# Patient Record
Sex: MALE | Race: WHITE | NOT HISPANIC OR LATINO | URBAN - METROPOLITAN AREA
[De-identification: names, ages, dates, MRNs, and addresses within clinical notes are randomized per-mention and may not be internally consistent; named-entity substitution may affect disease eponyms.]

---

## 2021-03-12 ENCOUNTER — HOSPITAL ENCOUNTER (EMERGENCY)
Facility: HOSPITAL | Age: 36
Discharge: HOME | End: 2021-03-12
Attending: EMERGENCY MEDICINE
Payer: COMMERCIAL

## 2021-03-12 VITALS
OXYGEN SATURATION: 99 % | HEART RATE: 76 BPM | TEMPERATURE: 98.4 F | SYSTOLIC BLOOD PRESSURE: 154 MMHG | DIASTOLIC BLOOD PRESSURE: 92 MMHG | RESPIRATION RATE: 16 BRPM

## 2021-03-12 DIAGNOSIS — G51.0 BELL PALSY: Primary | ICD-10-CM

## 2021-03-12 PROCEDURE — 86618 LYME DISEASE ANTIBODY: CPT | Performed by: PHYSICIAN ASSISTANT

## 2021-03-12 PROCEDURE — 99283 EMERGENCY DEPT VISIT LOW MDM: CPT

## 2021-03-12 PROCEDURE — 63700000 HC SELF-ADMINISTRABLE DRUG: Performed by: PHYSICIAN ASSISTANT

## 2021-03-12 PROCEDURE — 36415 COLL VENOUS BLD VENIPUNCTURE: CPT | Performed by: PHYSICIAN ASSISTANT

## 2021-03-12 RX ORDER — PREDNISONE 20 MG/1
60 TABLET ORAL ONCE
Status: COMPLETED | OUTPATIENT
Start: 2021-03-12 | End: 2021-03-12

## 2021-03-12 RX ORDER — ALBUTEROL SULFATE 90 UG/1
2 INHALANT RESPIRATORY (INHALATION) EVERY 6 HOURS PRN
COMMUNITY

## 2021-03-12 RX ORDER — VALACYCLOVIR HYDROCHLORIDE 1 G/1
1000 TABLET, FILM COATED ORAL ONCE
Status: COMPLETED | OUTPATIENT
Start: 2021-03-12 | End: 2021-03-12

## 2021-03-12 RX ORDER — VALACYCLOVIR HYDROCHLORIDE 1 G/1
1000 TABLET, FILM COATED ORAL 2 TIMES DAILY
Qty: 14 TABLET | Refills: 0 | Status: SHIPPED | OUTPATIENT
Start: 2021-03-12 | End: 2021-03-19

## 2021-03-12 RX ORDER — PREDNISONE 10 MG/1
10 TABLET ORAL SEE ADMIN INSTRUCTIONS
Qty: 15 TABLET | Refills: 0 | Status: SHIPPED | OUTPATIENT
Start: 2021-03-12 | End: 2021-03-17

## 2021-03-12 RX ADMIN — PREDNISONE 60 MG: 20 TABLET ORAL at 12:56

## 2021-03-12 RX ADMIN — VALACYCLOVIR HYDROCHLORIDE 1000 MG: 1 TABLET, FILM COATED ORAL at 12:56

## 2021-03-12 ASSESSMENT — ENCOUNTER SYMPTOMS
SPEECH DIFFICULTY: 0
VOICE CHANGE: 0
FACIAL SWELLING: 0
CHILLS: 0
LIGHT-HEADEDNESS: 0
ARTHRALGIAS: 0
TROUBLE SWALLOWING: 0
NUMBNESS: 0
COUGH: 0
NECK STIFFNESS: 0
SINUS PRESSURE: 0
HEADACHES: 0
DIZZINESS: 0
FEVER: 0
RHINORRHEA: 0
EYE ITCHING: 0
NAUSEA: 0
VOMITING: 0
EYE PAIN: 0
NECK PAIN: 0
MYALGIAS: 0
WEAKNESS: 0

## 2021-03-12 NOTE — ED PROVIDER NOTES
Emergency Medicine Note  HPI   HISTORY OF PRESENT ILLNESS     5-year-old male with a past medical history of asthma and seasonal allergies presents the emergency department stating that this morning after waking up he noticed he could utilize the facial muscles he needed to rinse and spit toothpaste.  He has since noted difficulty in swallowing, cheeks on right side. States right cheek feels mildly heavy but not swollen.  He denies any ear pain, hearing loss, dizziness, rash, headache, vision change, speech change, nausea, vomiting, dental pain, trouble swallowing, drooling.  He denies any recent illness.  He does work outside on a daily basis for the [employer].  He denies known tick bite.            Patient History   PAST HISTORY     Reviewed from Nursing Triage:      Past Medical History:   Diagnosis Date   • Asthma        History reviewed. No pertinent surgical history.    History reviewed. No pertinent family history.    Social History     Tobacco Use   • Smoking status: Never Smoker   • Smokeless tobacco: Never Used   Substance Use Topics   • Alcohol use: Yes   • Drug use: Never         Review of Systems   REVIEW OF SYSTEMS     Review of Systems   Constitutional: Negative for chills and fever.   HENT: Negative for congestion, ear pain, facial swelling, rhinorrhea, sinus pressure, tinnitus, trouble swallowing and voice change.    Eyes: Negative for pain, itching and visual disturbance.   Respiratory: Negative for cough.    Gastrointestinal: Negative for nausea and vomiting.   Musculoskeletal: Negative for arthralgias, myalgias, neck pain and neck stiffness.   Skin: Negative for rash.   Neurological: Negative for dizziness, speech difficulty, weakness, light-headedness, numbness and headaches.         VITALS     ED Vitals    Date/Time Temp Pulse Resp BP SpO2 [facility]   03/12/21 1238 36.9 °C (98.4 °F) 76 16 154/92 99 % MF        Pulse Ox %: 99 % (03/12/21 1311)  Pulse Ox Interpretation: Normal (03/12/21 1311)            Physical Exam   PHYSICAL EXAM     Physical Exam  Vitals signs and nursing note reviewed.   Constitutional:       General: He is not in acute distress.     Appearance: He is not toxic-appearing.   HENT:      Head: Normocephalic.      Right Ear: Tympanic membrane, ear canal and external ear normal.      Left Ear: Tympanic membrane, ear canal and external ear normal.      Nose: Nose normal.      Mouth/Throat:      Mouth: Mucous membranes are moist.      Pharynx: Oropharynx is clear.      Comments: Uvula raise and symmetric   Shoulder shrug intact bilaterally   Eyes:      Extraocular Movements: Extraocular movements intact.      Conjunctiva/sclera: Conjunctivae normal.      Pupils: Pupils are equal, round, and reactive to light.   Neck:      Musculoskeletal: Normal range of motion and neck supple.   Cardiovascular:      Rate and Rhythm: Normal rate and regular rhythm.   Pulmonary:      Effort: Pulmonary effort is normal.      Breath sounds: Normal breath sounds.   Skin:     General: Skin is warm.      Capillary Refill: Capillary refill takes less than 2 seconds.   Neurological:      Mental Status: He is alert and oriented to person, place, and time.      Sensory: Sensation is intact.      Coordination: Coordination is intact.      Comments: Right side facial motor deficits: unable to raise right eyebrow, puff out right cheek, make smile or frown on right side; can close right eye but not tightly    Extremity strength 5/5 proximally and distally   Psychiatric:         Mood and Affect: Mood normal.           PROCEDURES     Procedures     DATA     Results     Procedure Component Value Units Date/Time    Lyme EIA reflex WB [[id number]] Collected: 03/12/21 1255    Specimen: Blood, Venous Updated: 03/12/21 1304          Imaging Results    None         No orders to display       Scoring tools                               ED Course & MDM   MDM / ED COURSE and CLINICAL IMPRESSIONS     MDM    Clinical Impressions as of Mar 12  1329   Lay palsy            Ysabel Mcclain PA C  03/12/21 3375

## 2021-03-14 NOTE — ED ATTESTATION NOTE
The patient was evaluated and managed by the physician assistant / nurse practitioner.       Crissy Nolasco,   03/13/21 1938

## 2021-03-16 LAB — B BURGDOR AB SER IA-ACNC: 0.12 RATIO

## 2021-04-20 ENCOUNTER — IMPORTED ENCOUNTER (OUTPATIENT)
Dept: URBAN - METROPOLITAN AREA CLINIC 59 | Facility: CLINIC | Age: 36
End: 2021-04-20

## 2021-04-20 PROBLEM — H16.211 EXPOSURE KERATOCONJUNCTIVITIS, RIGHT EYE: Noted: 2021-04-20

## 2021-04-20 PROBLEM — H16.8 OTHER KERATITIS: Noted: 2021-04-20

## 2021-04-20 PROBLEM — G51.0 FACIAL PALSY: Noted: 2021-04-20

## 2021-04-20 PROBLEM — B02.39 ZOSTER BLEPHARITIS: Noted: 2021-04-20

## 2021-04-20 PROCEDURE — 99204 OFFICE O/P NEW MOD 45 MIN: CPT

## 2023-10-21 ASSESSMENT — VISUAL ACUITY
OD_SC: 20/25+1
OS_SC: 20/20
OU_SC: 20/20

## 2023-10-21 ASSESSMENT — TONOMETRY
OS_IOP_MMHG: 16
OD_IOP_MMHG: 17